# Patient Record
Sex: FEMALE | Race: BLACK OR AFRICAN AMERICAN | HISPANIC OR LATINO | Employment: UNEMPLOYED | URBAN - METROPOLITAN AREA
[De-identification: names, ages, dates, MRNs, and addresses within clinical notes are randomized per-mention and may not be internally consistent; named-entity substitution may affect disease eponyms.]

---

## 2023-02-16 ENCOUNTER — APPOINTMENT (OUTPATIENT)
Dept: LAB | Facility: CLINIC | Age: 53
End: 2023-02-16

## 2023-02-16 ENCOUNTER — OFFICE VISIT (OUTPATIENT)
Dept: FAMILY MEDICINE CLINIC | Facility: CLINIC | Age: 53
End: 2023-02-16

## 2023-02-16 VITALS
TEMPERATURE: 98.6 F | HEART RATE: 92 BPM | DIASTOLIC BLOOD PRESSURE: 76 MMHG | OXYGEN SATURATION: 99 % | WEIGHT: 144.4 LBS | RESPIRATION RATE: 16 BRPM | HEIGHT: 59 IN | BODY MASS INDEX: 29.11 KG/M2 | SYSTOLIC BLOOD PRESSURE: 128 MMHG

## 2023-02-16 DIAGNOSIS — Z86.2 HISTORY OF ANEMIA: ICD-10-CM

## 2023-02-16 DIAGNOSIS — L90.5 SCARRING: ICD-10-CM

## 2023-02-16 DIAGNOSIS — Z23 ENCOUNTER FOR IMMUNIZATION: ICD-10-CM

## 2023-02-16 DIAGNOSIS — Z11.3 SCREENING EXAMINATION FOR STD (SEXUALLY TRANSMITTED DISEASE): ICD-10-CM

## 2023-02-16 DIAGNOSIS — J45.20 MILD INTERMITTENT ASTHMA WITHOUT COMPLICATION: Primary | ICD-10-CM

## 2023-02-16 DIAGNOSIS — Z76.89 ENCOUNTER TO ESTABLISH CARE: ICD-10-CM

## 2023-02-16 DIAGNOSIS — J30.1 SEASONAL ALLERGIC RHINITIS DUE TO POLLEN: ICD-10-CM

## 2023-02-16 DIAGNOSIS — Z01.89 ENCOUNTER FOR LABORATORY EXAMINATION: ICD-10-CM

## 2023-02-16 DIAGNOSIS — Z12.11 COLON CANCER SCREENING: ICD-10-CM

## 2023-02-16 DIAGNOSIS — Z12.31 ENCOUNTER FOR SCREENING MAMMOGRAM FOR MALIGNANT NEOPLASM OF BREAST: ICD-10-CM

## 2023-02-16 PROBLEM — J45.909 ASTHMA: Status: ACTIVE | Noted: 2023-02-16

## 2023-02-16 LAB
ANION GAP SERPL CALCULATED.3IONS-SCNC: 5 MMOL/L (ref 4–13)
BASOPHILS # BLD AUTO: 0.04 THOUSANDS/ÂΜL (ref 0–0.1)
BASOPHILS NFR BLD AUTO: 1 % (ref 0–1)
BUN SERPL-MCNC: 8 MG/DL (ref 5–25)
CALCIUM SERPL-MCNC: 9.5 MG/DL (ref 8.3–10.1)
CHLORIDE SERPL-SCNC: 106 MMOL/L (ref 96–108)
CHOLEST SERPL-MCNC: 189 MG/DL
CO2 SERPL-SCNC: 24 MMOL/L (ref 21–32)
CREAT SERPL-MCNC: 0.65 MG/DL (ref 0.6–1.3)
EOSINOPHIL # BLD AUTO: 0.21 THOUSAND/ÂΜL (ref 0–0.61)
EOSINOPHIL NFR BLD AUTO: 3 % (ref 0–6)
ERYTHROCYTE [DISTWIDTH] IN BLOOD BY AUTOMATED COUNT: 15.9 % (ref 11.6–15.1)
EST. AVERAGE GLUCOSE BLD GHB EST-MCNC: 114 MG/DL
GFR SERPL CREATININE-BSD FRML MDRD: 102 ML/MIN/1.73SQ M
GLUCOSE P FAST SERPL-MCNC: 94 MG/DL (ref 65–99)
HBA1C MFR BLD: 5.6 %
HCT VFR BLD AUTO: 38.5 % (ref 34.8–46.1)
HCV AB SER QL: NORMAL
HDLC SERPL-MCNC: 37 MG/DL
HGB BLD-MCNC: 11.4 G/DL (ref 11.5–15.4)
IMM GRANULOCYTES # BLD AUTO: 0.01 THOUSAND/UL (ref 0–0.2)
IMM GRANULOCYTES NFR BLD AUTO: 0 % (ref 0–2)
LDLC SERPL CALC-MCNC: 128 MG/DL (ref 0–100)
LYMPHOCYTES # BLD AUTO: 3.66 THOUSANDS/ÂΜL (ref 0.6–4.47)
LYMPHOCYTES NFR BLD AUTO: 51 % (ref 14–44)
MCH RBC QN AUTO: 24.5 PG (ref 26.8–34.3)
MCHC RBC AUTO-ENTMCNC: 29.6 G/DL (ref 31.4–37.4)
MCV RBC AUTO: 83 FL (ref 82–98)
MONOCYTES # BLD AUTO: 0.47 THOUSAND/ÂΜL (ref 0.17–1.22)
MONOCYTES NFR BLD AUTO: 7 % (ref 4–12)
NEUTROPHILS # BLD AUTO: 2.67 THOUSANDS/ÂΜL (ref 1.85–7.62)
NEUTS SEG NFR BLD AUTO: 38 % (ref 43–75)
NRBC BLD AUTO-RTO: 0 /100 WBCS
PLATELET # BLD AUTO: 374 THOUSANDS/UL (ref 149–390)
PMV BLD AUTO: 8.7 FL (ref 8.9–12.7)
POTASSIUM SERPL-SCNC: 4 MMOL/L (ref 3.5–5.3)
RBC # BLD AUTO: 4.66 MILLION/UL (ref 3.81–5.12)
SODIUM SERPL-SCNC: 135 MMOL/L (ref 135–147)
TRIGL SERPL-MCNC: 118 MG/DL
WBC # BLD AUTO: 7.06 THOUSAND/UL (ref 4.31–10.16)

## 2023-02-16 RX ORDER — MONTELUKAST SODIUM 10 MG/1
10 TABLET ORAL
Qty: 90 TABLET | Refills: 1 | Status: SHIPPED | OUTPATIENT
Start: 2023-02-16

## 2023-02-16 RX ORDER — FLUTICASONE PROPIONATE 50 MCG
1 SPRAY, SUSPENSION (ML) NASAL DAILY
Qty: 11.1 ML | Refills: 3 | Status: SHIPPED | OUTPATIENT
Start: 2023-02-16

## 2023-02-16 RX ORDER — ALBUTEROL SULFATE 90 UG/1
2 AEROSOL, METERED RESPIRATORY (INHALATION) EVERY 4 HOURS PRN
Status: CANCELLED | OUTPATIENT
Start: 2023-02-16

## 2023-02-16 RX ORDER — ALBUTEROL SULFATE 90 UG/1
2 AEROSOL, METERED RESPIRATORY (INHALATION) EVERY 6 HOURS PRN
Qty: 18 G | Refills: 5 | Status: SHIPPED | OUTPATIENT
Start: 2023-02-16

## 2023-02-16 RX ORDER — ZOSTER VACCINE RECOMBINANT, ADJUVANTED 50 MCG/0.5
0.5 KIT INTRAMUSCULAR ONCE
Qty: 1 EACH | Refills: 1 | Status: SHIPPED | OUTPATIENT
Start: 2023-02-16 | End: 2023-02-16

## 2023-02-16 NOTE — ASSESSMENT & PLAN NOTE
-Asthma is controlled  Last asthma attacks was last month  -Recommend using albuterol PRN  -Discussed about avoiding triggers

## 2023-02-16 NOTE — PROGRESS NOTES
Name: Stacy Ta      : 1970      MRN: [id number]  Encounter Provider: PETER Archer  Encounter Date: 2023   Encounter department: [address] [place] [address]     1  Mild intermittent asthma without complication  Assessment & Plan:  -Asthma is controlled  Last asthma attacks was last month  -Recommend using albuterol PRN  -Discussed about avoiding triggers  Orders:  -     albuterol (Ventolin HFA) 90 mcg/act inhaler; Inhale 2 puffs every 6 (six) hours as needed for wheezing    2  Encounter to establish care  Assessment & Plan:  -Advised pt to schedule cervical CA screening appt  3  BMI 29 0-29 9,adult  Assessment & Plan: Body mass index is 29 67 kg/m²  The BMI is above normal  Nutrition recommendations include reducing portion sizes, decreasing overall calorie intake, 3-5 servings of fruits/vegetables daily, reducing fast food intake, consuming healthier snacks, decreasing soda and/or juice intake, moderation in carbohydrate intake, increasing intake of lean protein, reducing intake of saturated fat and trans fat and reducing intake of cholesterol  Exercise recommendations include moderate aerobic physical activity for 150 minutes/week  Orders:  -     Basic metabolic panel; Future  -     Lipid Panel with Direct LDL reflex; Future  -     HEMOGLOBIN A1C W/ EAG ESTIMATION; Future    4  Encounter for immunization  -     influenza vaccine, quadrivalent, recombinant, PF, 0 5 mL, for patients 18 yr+ (FLUBLOK)  -     TDAP VACCINE GREATER THAN OR EQUAL TO 6YO IM  -     Zoster Vac Recomb Adjuvanted (Shingrix) 50 MCG/0 5ML SUSR; Inject 0 5 mL into a muscle once for 1 dose Repeat dose in 2 to 6 months  -     Pneumococcal Conjugate Vaccine 20-valent (Pcv20)    5  Screening examination for STD (sexually transmitted disease)  -     Chlamydia/GC amplified DNA by PCR; Future  -     Hepatitis C antibody;  Future  -     Trichomonas Vaginalis, SALLY; Future  -     : HIV 1/2 AB/AG w Reflex [facility] for 2 yr old and above; Future  -     RPR (Reflex Only-Do Not Order); Future    6  Seasonal allergic rhinitis due to pollen  Assessment & Plan:  - Continue montelukast   - May use flonase if montelukast does not help  - Avoid triggers    Orders:  -     fluticasone (FLONASE) 50 mcg/act nasal spray; 1 spray into each nostril daily  -     montelukast (SINGULAIR) 10 mg tablet; Take 1 tablet (10 mg total) by mouth daily at bedtime    7  Colon cancer screening  -     Ambulatory referral for colonoscopy; Future    8  Encounter for screening mammogram for malignant neoplasm of breast  -     Mammo screening bilateral w 3d & cad; Future; Expected date: 02/16/2023    9  History of anemia  Assessment & Plan:  -Patient reported having Hx of anemia  Denies overt signs of bleeding, does not know cause of prior anemia   -Will check CBC  Orders:  -     CBC and differential; Future    10  Scarring  -     Ambulatory Referral to Plastic Surgery; Future         Subjective        Viry Albarran is a 46year old female who presents today to [facility]  Patient is here for evaluation of asthma  Patient states asthma symptoms has been controlled  Last asthma attack was a month ago  Patient's symptoms include dyspnea, non-productive cough and wheezing  Associated symptoms include nasal congestion  The patient has been suffering from these symptoms for approximately 35  years  Patient states taking montelukast for allergies which helps keep asthma better controlled as per the patient  Suspected precipitants include cold and hot air  Patient has not required emergency room treatment for these symptoms, and has not required hospitalization  She does use albuterol when she has SOB or wheezing but reports rare use  Once a month at the most      She is requesting STD screening  She denies any known exposure but wants STD testing for reassurance sake      She would also like a plastic surgeon referral for her  scar as it makes her feel very self-conscious  Review of Systems   Constitutional: Negative for chills and fever  HENT: Negative for ear pain and sore throat  Eyes: Negative for pain and visual disturbance  Respiratory: Negative for cough and shortness of breath  Cardiovascular: Negative for chest pain and palpitations  Gastrointestinal: Negative for abdominal pain and vomiting  Genitourinary: Negative for dysuria and hematuria  Musculoskeletal: Negative for arthralgias and back pain  Skin: Negative for color change and rash  Neurological: Negative for seizures and syncope  Hematological: Negative  Psychiatric/Behavioral: Negative  All other systems reviewed and are negative  No current outpatient medications on file prior to visit  Objective     /76 (BP Location: Right arm, Patient Position: Sitting, Cuff Size: Standard)   Pulse 92   Temp 98 6 °F (37 °C) (Temporal)   Resp 16   Ht 4' 10 5" (1 486 m)   Wt 65 5 kg (144 lb 6 4 oz)   SpO2 99%   BMI 29 67 kg/m²     Physical Exam  Vitals and nursing note reviewed  Constitutional:       Appearance: Normal appearance  She is overweight  HENT:      Head: Normocephalic  Right Ear: Tympanic membrane, ear canal and external ear normal       Left Ear: Tympanic membrane, ear canal and external ear normal       Nose: Nose normal       Mouth/Throat:      Mouth: Mucous membranes are moist       Pharynx: Oropharynx is clear  Eyes:      General:         Right eye: No discharge  Left eye: No discharge  Cardiovascular:      Rate and Rhythm: Normal rate and regular rhythm  Pulses: Normal pulses  Heart sounds: Normal heart sounds  Pulmonary:      Effort: Pulmonary effort is normal       Breath sounds: Normal breath sounds  Abdominal:      General: Abdomen is flat  Bowel sounds are normal    Musculoskeletal:         General: Normal range of motion        Cervical back: Normal range of motion  Right lower leg: No edema  Left lower leg: No edema  Skin:     General: Skin is warm and dry  Neurological:      General: No focal deficit present  Mental Status: She is alert and oriented to person, place, and time  Psychiatric:         Mood and Affect: Mood normal          Behavior: Behavior normal          Thought Content:  Thought content normal          Judgment: Judgment normal        Tessa Anca

## 2023-02-16 NOTE — ASSESSMENT & PLAN NOTE
Body mass index is 29 67 kg/m²  The BMI is above normal  Nutrition recommendations include reducing portion sizes, decreasing overall calorie intake, 3-5 servings of fruits/vegetables daily, reducing fast food intake, consuming healthier snacks, decreasing soda and/or juice intake, moderation in carbohydrate intake, increasing intake of lean protein, reducing intake of saturated fat and trans fat and reducing intake of cholesterol  Exercise recommendations include moderate aerobic physical activity for 150 minutes/week

## 2023-02-17 LAB
C TRACH DNA SPEC QL NAA+PROBE: NEGATIVE
HIV 1+2 AB+HIV1 P24 AG SERPL QL IA: NORMAL
HIV 2 AB SERPL QL IA: NORMAL
HIV1 AB SERPL QL IA: NORMAL
HIV1 P24 AG SERPL QL IA: NORMAL
N GONORRHOEA DNA SPEC QL NAA+PROBE: NEGATIVE
TREPONEMA PALLIDUM IGG+IGM AB [PRESENCE] IN SERUM OR PLASMA BY IMMUNOASSAY: NORMAL

## 2023-02-18 LAB — T VAGINALIS RRNA SPEC QL NAA+PROBE: NEGATIVE

## 2023-03-08 ENCOUNTER — APPOINTMENT (OUTPATIENT)
Dept: LAB | Facility: CLINIC | Age: 53
End: 2023-03-08

## 2023-03-08 ENCOUNTER — OFFICE VISIT (OUTPATIENT)
Dept: OBGYN CLINIC | Facility: CLINIC | Age: 53
End: 2023-03-08

## 2023-03-08 VITALS
WEIGHT: 147.2 LBS | BODY MASS INDEX: 29.68 KG/M2 | HEART RATE: 94 BPM | DIASTOLIC BLOOD PRESSURE: 66 MMHG | SYSTOLIC BLOOD PRESSURE: 126 MMHG | HEIGHT: 59 IN

## 2023-03-08 DIAGNOSIS — Z01.419 ENCOUNTER FOR GYNECOLOGICAL EXAMINATION WITHOUT ABNORMAL FINDING: Primary | ICD-10-CM

## 2023-03-08 DIAGNOSIS — Z11.3 SCREEN FOR STD (SEXUALLY TRANSMITTED DISEASE): ICD-10-CM

## 2023-03-08 DIAGNOSIS — Z12.39 ENCOUNTER FOR BREAST CANCER SCREENING USING NON-MAMMOGRAM MODALITY: ICD-10-CM

## 2023-03-08 DIAGNOSIS — Z12.31 ENCOUNTER FOR SCREENING MAMMOGRAM FOR MALIGNANT NEOPLASM OF BREAST: ICD-10-CM

## 2023-03-08 DIAGNOSIS — Z12.11 SCREENING FOR COLON CANCER: ICD-10-CM

## 2023-03-08 DIAGNOSIS — Z12.4 SCREENING FOR CERVICAL CANCER: ICD-10-CM

## 2023-03-08 PROBLEM — L90.5 SCARRING: Status: RESOLVED | Noted: 2023-02-16 | Resolved: 2023-03-08

## 2023-03-08 PROBLEM — J45.909 ASTHMA DUE TO SEASONAL ALLERGIES: Status: RESOLVED | Noted: 2023-02-16 | Resolved: 2023-03-08

## 2023-03-08 PROBLEM — J30.1 SEASONAL ALLERGIC RHINITIS DUE TO POLLEN: Status: RESOLVED | Noted: 2023-02-16 | Resolved: 2023-03-08

## 2023-03-08 PROBLEM — Z86.2 HISTORY OF ANEMIA: Status: RESOLVED | Noted: 2023-02-16 | Resolved: 2023-03-08

## 2023-03-08 PROBLEM — Z76.89 ENCOUNTER TO ESTABLISH CARE: Status: RESOLVED | Noted: 2023-02-16 | Resolved: 2023-03-08

## 2023-03-08 LAB
HBV SURFACE AG SER QL: NORMAL
HCV AB SER QL: NORMAL

## 2023-03-08 NOTE — LETTER
3/9/2023    To Elaine Gr  : 1970      Esta carta es para informarle que soraida resultados recientes de ANÁLISIS DE [place] para Enfermedades de Transmisión Sexual (Gonzalo Junior, hepatitis B, hepatitis C, y sífilis) fueron revisados por  y son Joselyn Flores    Póngase en contacto con nuestra oficina para kaylen ezio si tiene alguna inquietud [address] [place] [address]

## 2023-03-08 NOTE — PROGRESS NOTES
Sterling Rick is a 46 y o  female who presents today for annual GYN exam   Her last pap smear was performed last year and result was WNL per patient  She reports no history of abnormal pap smears in her past   Her last mammogram was performed last year and result was WNL per patient  She reports that she had colon cancer screening performed in  while still living in [place] and states that it was WNL and that she should have it repeated in 5 years  She reports menses as irregular for past 2 years  Patient's last menstrual period was 2023  Her general medical history has been reviewed and she reports it as follows:    Past Medical History:   Diagnosis Date   • Asthma      Past Surgical History:   Procedure Laterality Date   •  SECTION     • EXCISIONAL HEMORRHOIDECTOMY       OB History        4    Para   2    Term   2       0    AB   2    Living   2       SAB   0    IAB   2    Ectopic   0    Multiple   0    Live Births   2               Social History     Tobacco Use   • Smoking status: Never   • Smokeless tobacco: Never   Vaping Use   • Vaping Use: Never used   Substance Use Topics   • Alcohol use: Never   • Drug use: Never     Social History     Substance and Sexual Activity   Sexual Activity Yes   • Partners: Male   • Birth control/protection: None     Cancer-related family history includes Breast cancer in her sister; Colon cancer in her father  There is no history of Ovarian cancer  Current Outpatient Medications   Medication Instructions   • albuterol (Ventolin HFA) 90 mcg/act inhaler 2 puffs, Inhalation, Every 6 hours PRN   • fluticasone (FLONASE) 50 mcg/act nasal spray 1 spray, Nasal, Daily   • montelukast (SINGULAIR) 10 mg, Oral, Daily at bedtime       Review of Systems:  Review of Systems   Constitutional: Negative  Gastrointestinal: Negative      Genitourinary: Negative for difficulty urinating, menstrual problem, pelvic pain and vaginal discharge  Skin: Negative  Physical Exam:  /66   Pulse 94   Ht 4' 10 5" (1 486 m)   Wt 66 8 kg (147 lb 3 2 oz)   LMP 03/01/2023   BMI 30 24 kg/m²   Physical Exam  Constitutional:       General: She is not in acute distress  Appearance: She is well-developed  Genitourinary:      Vulva normal       No lesions in the vagina  Right Adnexa: not tender and no mass present  Left Adnexa: not tender and no mass present  No cervical motion tenderness or lesion  Uterus is not tender  Breasts:     Right: No mass, nipple discharge, skin change or tenderness  Left: No mass, nipple discharge, skin change or tenderness  Neck:      Thyroid: No thyromegaly  Cardiovascular:      Rate and Rhythm: Normal rate and regular rhythm  Pulmonary:      Effort: Pulmonary effort is normal    Abdominal:      Palpations: Abdomen is soft  Tenderness: There is no abdominal tenderness  Musculoskeletal:      Cervical back: Neck supple  Neurological:      Mental Status: She is alert and oriented to person, place, and time  Skin:     General: Skin is warm and dry  Vitals reviewed  Assessment/Plan:   1  Normal well-woman GYN exam   2  Cervical cancer screening:  Normal cervical exam   Pap smear done with HPV co-testing  3  STD screening:  Orders placed for vaginal GC/CT cultures  Orders placed for serum anti-HIV, anti-HCV, HbsAg, RPR    4  Breast cancer screening:  Normal breast exam   Order placed for bilateral screening mammogram   Reviewed breast self-awareness  5  Colon cancer screening:  Up to date per patient report  6  Depression Screening: Patient's depression screening was assessed with a PHQ-2 score of 2  Their PHQ-9 score was 4  Clinically patient does not have depression  No treatment is required  7  BMI Counseling: Body mass index is 30 24 kg/m²  Discussed the patient's BMI with her   The BMI is above normal  Patient referred to PCP due to patient being obese  8  Contraception:  Declines     9  Return to office in 1 year for annual GYN exam

## 2023-03-08 NOTE — PATIENT INSTRUCTIONS
Ale por ha confianza en nuestro equipo  Seretha Rocky y agradecemos soraida comentarios  Si recibe kaylen encuesta nuestra, tómese unos momentos para informarnos cómo estamos  Sinceramente,  Meeker Solian, CRNP     OBESIDAD     Obesidad es cuando ha índice de masa corporal [facility]) es superior a 30  Ha Body mass index is 30 24 kg/m²  [facility] Los riesgos de la obesidad incluyen  muchos problemas de [place], incluidas las lesiones y la discapacidad física  Es posible que deba realizarse exámenes para detectar lo siguiente:  Diabetes     Hipertensión o colesterol altoEnfermedades del corazón     Enfermedad cardíaca     Enfermedades del hígado o de la vesícula biliar     Cáncer de colon, de pecho, de próstata, de hígado o de riñón     El apnea del sueño     Artritis o gota    Busque atención médica de inmediato si:   Usted tiene un intenso dolor de lay, confusión o dificultad para hablar  Usted tiene debilidad en un lado del cuerpo  Usted tiene [employer], sudoración o falta de aire  Pregúntele a ha [place] vitaminas y minerales son adecuados para usted  Usted tiene síntomas de enfermedad de la vesícula biliar o el hígado, walter dolor en la parte superior del abdomen  Usted tiene dolor e incomodidad de rodillas o caderas al caminar  Usted presenta síntomas de diabetes, walter exceso de apetito y sed y micción frecuente  Usted presenta síntomas de apnea de sueño, walter roncar o tener sueño la el día  Usted tiene preguntas o inquietudes acerca de ha condición o cuidado  El tratamiento para la obesidad  se enfoca en ayudarle a bajar de peso para mejorar ha faith  Incluso kaylen reducción mínima del índice de [employer] corporal puede reducir el riesgo de muchos problemas de [place]  Ha médico lo ayudará a fijarse kaylen meta para bajar de [place]  Cambios en el estilo de hernandez  son los primeros pasos para tratar la obesidad   Estos cambios incluyen carmen decisiones para consumir alimentos saludables y realizar kaylen actividad física con regularidad  El médico puede recomendarle un programa para bajar de peso que consta de capacitación, educación y [place]  Medicamento  le pueden ayudar a bajar de peso cuando los [place] en conjunto con Milly Rojas y Rob Roth  Cirugía  le puede ayudar a bajar de peso si usted es muy cindi y presenta otros problemas de faith  Existen varios tipos de [facility] para adelgazar  [place] a ha médico más información  Cómo perder peso de forma exitosa:   Propóngase metas accesibles y realistas  Un ejemplo de kaylen meta accesible es caminar 20 minutos los 5 días de la [place]  Otro objetivo puede ser perder 5% de ha peso corporal     Coméntele a soraida amigos, familiares y compañeros de trabajo sobre soraida metas  y solicite que lo apoyen  Convide a un amigo para hacer ejercicio juntos o acuda a un wanda de motivación para bajar de [place]  Identifique los alimentos o situaciones que le pueden provocar que coma en exceso y busque formas para evitarlos  Deshágase de alimentos altos en calorías en ha hogar o en el trabajo  En la cocina tenga kaylen canasta con frutas frescas  Si el estrés es la causa para que usted coma más encuentre formas para sobrellevar el estrés  Lleve un diario en el que registre lo que usted come y facundo  También escriba la cantidad de tiempo que pasa realizando kaylen actividad física la el día  Pésese kaylen vez a la semana y anótelo en ah diario  Cambios en la alimentación:  Usted necesitará consumir menos de 500 a 1 000 calorías al día de las que usted actualmente consume para perder entre 1 a 2 libras a la semana  Los siguientes cambios le ayudarán a disminuir la cantidad de las calorías que normalmente consume:  Reduzca el tamaño de las porciones  Utilice platos pequeños que no midan más de 9 pulgadas de diámetro  Llene la mitad del plato con frutas y verduras   Utilice las tazas medidoras para racionar los alimentos hasta que usted sepa walter se ve el tamaño de kaylen porción  Consuma 3 comidas y 1 o 2 meriendas al día  Planee soraida comidas con anterioridad  Cocine y coma en la casa todo el tiempo que le sea posible  Coma lentamente  Consuma frutas y verduras con todas las comidas  Corinda Rene y verduras son bajas en calorías y altas en fibra lo cual lo llena  No adicione mantequilla, ni margarina, ni salsas a base de crema a las verduras  Utilice las hierbas para sazonar las verduras al vapor  Consuma menos grasas y alimentos fritos  Consuma ryan o pescado al horno o la juan f  Estas proteínas son más bajas en calorías y grasas que la carne [place]  Limite las comidas rápidas  Es mejor que utilice aderezos para la ensalada a base de aceite de [place] y vinagre en vez de aderezos en botella  Limite la cantidad de azúcar que consume  No consuma bebidas azucaradas  Limite el consumo de alcohol  Cambios de actividad:  La actividad física es buena para ha cuerpo por muchas razones  Le ayuda a quemar calorías y fortalecer soraida músculos  Merrily Leticia y la depresión y mejora ha estado de ánimo  Además puede ayudarle a dormir mejor  Consulte con ha médico antes de empezar un régimen de ejercicios  Realice kaylen actividad física por lo menos por 30 minutos 5 días a la semana  Empiece despacio  Aparte tiempo cada día para kaylen actividad física que usted victor hugo y [employer] sea [facility]  Es mejor realizar tanto un programa de pesas wal[place] para aumentar ha ritmo cardíaco, walter caminar, montar en bicicleta o nadar  Busque formas para ser [employer]  Realice kaylen actividad de jardinería y limpiar la casa  [address] escaleras en vez de utilizar el elevador  En ha tiempo bill concurra a eventos que le exijan caminar, walter festivales y ferias al [place]  Adicionar esta actividad física puede ayudarle a bajar y [address]  Acuda a soraida consultas de control con ha médico según le indicaron    Puede que necesite consultar con un dietista  Anote soraida preguntas para que se acuerde de hacerlas la soraida visitas

## 2023-03-08 NOTE — LETTER
3/13/2023    To Elaine Gr  : 1970      Esta carta es para informarle que soraida CULTURAS recientes para la gonorrea y la clamidia fueron revisadas por mí y son Gerardine Jan    Comuníquese con la oficina para kaylen ezio si tiene alguna inquietud [address]

## 2023-03-09 LAB
HIV 1+2 AB+HIV1 P24 AG SERPL QL IA: NORMAL
HIV 2 AB SERPL QL IA: NORMAL
HIV1 AB SERPL QL IA: NORMAL
HIV1 P24 AG SERPL QL IA: NORMAL
TREPONEMA PALLIDUM IGG+IGM AB [PRESENCE] IN SERUM OR PLASMA BY IMMUNOASSAY: NORMAL

## 2023-03-10 LAB
HPV HR 12 DNA CVX QL NAA+PROBE: POSITIVE
HPV16 DNA CVX QL NAA+PROBE: NEGATIVE
HPV18 DNA CVX QL NAA+PROBE: NEGATIVE

## 2023-03-11 LAB
C TRACH DNA SPEC QL NAA+PROBE: NEGATIVE
N GONORRHOEA DNA SPEC QL NAA+PROBE: NEGATIVE

## 2023-03-16 ENCOUNTER — TELEPHONE (OUTPATIENT)
Dept: OBGYN CLINIC | Facility: CLINIC | Age: 53
End: 2023-03-16

## 2023-03-16 LAB
LAB AP GYN PRIMARY INTERPRETATION: NORMAL
Lab: NORMAL

## 2023-03-16 NOTE — TELEPHONE ENCOUNTER
Please contact patient and advise her of normal pap smear, but + HPV  Negative for the 2 most concerning HPV strains though  Recommend repeat pap smear in 1 year

## 2023-10-11 ENCOUNTER — OFFICE VISIT (OUTPATIENT)
Dept: FAMILY MEDICINE CLINIC | Facility: CLINIC | Age: 53
End: 2023-10-11

## 2023-10-11 VITALS
OXYGEN SATURATION: 98 % | WEIGHT: 149 LBS | BODY MASS INDEX: 30.04 KG/M2 | RESPIRATION RATE: 18 BRPM | TEMPERATURE: 97.8 F | DIASTOLIC BLOOD PRESSURE: 80 MMHG | SYSTOLIC BLOOD PRESSURE: 118 MMHG | HEART RATE: 74 BPM | HEIGHT: 59 IN

## 2023-10-11 DIAGNOSIS — G56.03 BILATERAL CARPAL TUNNEL SYNDROME: ICD-10-CM

## 2023-10-11 DIAGNOSIS — N95.1 HOT FLASHES DUE TO MENOPAUSE: Primary | ICD-10-CM

## 2023-10-11 DIAGNOSIS — J30.1 SEASONAL ALLERGIC RHINITIS DUE TO POLLEN: ICD-10-CM

## 2023-10-11 DIAGNOSIS — J45.20 MILD INTERMITTENT ASTHMA WITHOUT COMPLICATION: ICD-10-CM

## 2023-10-11 PROCEDURE — 99214 OFFICE O/P EST MOD 30 MIN: CPT

## 2023-10-11 RX ORDER — FLUTICASONE PROPIONATE 50 MCG
1 SPRAY, SUSPENSION (ML) NASAL DAILY
Qty: 11.1 ML | Refills: 3 | Status: SHIPPED | OUTPATIENT
Start: 2023-10-11

## 2023-10-11 RX ORDER — VITAMIN E 268 MG
400 CAPSULE ORAL DAILY
Qty: 90 CAPSULE | Refills: 1 | Status: SHIPPED | OUTPATIENT
Start: 2023-10-11

## 2023-10-11 RX ORDER — ALBUTEROL SULFATE 90 UG/1
2 AEROSOL, METERED RESPIRATORY (INHALATION) EVERY 6 HOURS PRN
Qty: 18 G | Refills: 5 | Status: SHIPPED | OUTPATIENT
Start: 2023-10-11

## 2023-10-11 RX ORDER — NAPROXEN SODIUM 220 MG
220 TABLET ORAL 2 TIMES DAILY WITH MEALS
Qty: 90 TABLET | Refills: 1 | Status: SHIPPED | OUTPATIENT
Start: 2023-10-11

## 2023-10-11 RX ORDER — MONTELUKAST SODIUM 10 MG/1
10 TABLET ORAL
Qty: 90 TABLET | Refills: 1 | Status: SHIPPED | OUTPATIENT
Start: 2023-10-11

## 2023-10-11 NOTE — PROGRESS NOTES
Name: Addy Anders      : 1970      MRN: [id number]  Encounter Provider: PETER Hernadez  Encounter Date: 10/11/2023   Encounter department: [address] [facility] [facility]     1. Hot flashes due to menopause  Assessment & Plan:  Start vit E. Discussed lifestyle management. Recommend seeing obgyn if no improvement    Orders:  -     vitamin E, tocopherol, 400 units capsule; Take 1 capsule (400 Units total) by mouth daily    2. Mild intermittent asthma without complication  -     albuterol (Ventolin HFA) 90 mcg/act inhaler; Inhale 2 puffs every 6 (six) hours as needed for wheezing    3. Seasonal allergic rhinitis due to pollen  -     fluticasone (FLONASE) 50 mcg/act nasal spray; 1 spray into each nostril daily  -     montelukast (SINGULAIR) 10 mg tablet; Take 1 tablet (10 mg total) by mouth daily at bedtime    4. Bilateral carpal tunnel syndrome  Assessment & Plan:  - Wrist splints, especially at night  - OT/PT  - tylenol/nsaids prn  - Consider EMG if no improvement. - ortho f/u if no improvement for possible cortisone injection. Orders:  -     naproxen sodium (ALEVE) 220 MG tablet; Take 1 tablet (220 mg total) by mouth 2 (two) times a day with meals  -     Ambulatory referral to PT/OT hand therapy; Future           Subjective      Addy Anders is a 48 y.o. female  has a past medical history of Abnormal Pap smear of cervix and Asthma. has a past surgical history that includes  section () and Excisional hemorrhoidectomy (). She reports that she has been having hot flashes began about 5 months ago. Hot flashes occur randomly. LMP was about 2 months ago. She has not tried medication for this. Carpal Tunnel Syndrome  Patient presents for evaluation of pain in hands, hand paresthesias, and possible carpal tunnel syndrome. Onset of the symptoms was 2 months ago. Current symptoms include: pain, tingling/numbness, and weakness. Aggravating factors: denies target symptoms of hypothyroidism, worse at night or first thing in the morning, and worse with activity. Symptoms have progressed to a point and plateaued. Evaluation to date: none. Treatment to date: none. Pain is 8/10    Chronic conditions are stable unless otherwise noted. Hand Pain   Associated symptoms include numbness. Pertinent negatives include no chest pain. Review of Systems   Constitutional:  Negative for chills and fever. HENT:  Negative for ear pain and sore throat. Eyes:  Negative for pain and visual disturbance. Respiratory:  Negative for cough and shortness of breath. Cardiovascular:  Negative for chest pain and palpitations. Gastrointestinal:  Negative for abdominal pain and vomiting. Genitourinary:  Negative for dysuria and hematuria. Musculoskeletal:  Positive for myalgias. Negative for arthralgias and back pain. Skin:  Negative for color change and rash. Neurological:  Positive for numbness. Negative for seizures and syncope. All other systems reviewed and are negative. Current Outpatient Medications on File Prior to Visit   Medication Sig    [DISCONTINUED] albuterol (Ventolin HFA) 90 mcg/act inhaler Inhale 2 puffs every 6 (six) hours as needed for wheezing    [DISCONTINUED] fluticasone (FLONASE) 50 mcg/act nasal spray 1 spray into each nostril daily    [DISCONTINUED] montelukast (SINGULAIR) 10 mg tablet Take 1 tablet (10 mg total) by mouth daily at bedtime       Objective     /80 (BP Location: Left arm, Patient Position: Sitting, Cuff Size: Standard)   Pulse 74   Temp 97.8 °F (36.6 °C) (Temporal)   Resp 18   Ht 4' 10.5" (1.486 m)   Wt 67.6 kg (149 lb)   LMP  (LMP Unknown)   SpO2 98%   BMI 30.61 kg/m²     Physical Exam  Vitals and nursing note reviewed. Constitutional:       Appearance: She is obese. HENT:      Head: Normocephalic and atraumatic.       Right Ear: External ear normal.      Left Ear: External ear normal.      Nose: Nose normal.   Eyes:      Extraocular Movements: Extraocular movements intact. Conjunctiva/sclera: Conjunctivae normal.      Pupils: Pupils are equal, round, and reactive to light. Cardiovascular:      Rate and Rhythm: Normal rate and regular rhythm. Pulses: Normal pulses. Heart sounds: Normal heart sounds. Pulmonary:      Effort: Pulmonary effort is normal.      Breath sounds: Normal breath sounds. Abdominal:      General: Bowel sounds are normal.      Palpations: Abdomen is soft. Tenderness: There is no abdominal tenderness. Musculoskeletal:         General: Normal range of motion. Right hand: Normal.      Left hand: Normal.      Cervical back: Normal range of motion. Comments: Bilateral hands: (-) tinels, (-) phalens, (+) durkans     Skin:     General: Skin is warm and dry. Neurological:      General: No focal deficit present. Mental Status: She is alert and oriented to person, place, and time. Mental status is at baseline. Psychiatric:         Mood and Affect: Mood normal.         Behavior: Behavior normal.         Thought Content:  Thought content normal.         Judgment: Judgment normal.       Tanvi Mealing

## 2023-10-11 NOTE — PATIENT INSTRUCTIONS
Síndrome del túnel carpiano   CUIDADO AMBULATORIO:   El síndrome del túnel carpiano es kaylen afección que causa que se acumule presión en el túnel carpiano. El túnel carpiano es kaylen área pequeña entre los [place] carlo y tejidos de ha [place]. Kaylen inflamación en esta área pone presión en el nervio mediano. El nervio mediano controla los músculos y la sensación en la [place]. Los signos y síntomas comunes son:  Dolor sordo, alison y punzante en la mano    Sensación de entumecimiento, hormigueo o ardor en el pulgar y en los dedos millie, [place] y anular    Dolor u hormigueo en el brazo que puede subir hacia el hombro    Debilidad en ha mano    Inflamación en ha mano    No poder controlar el movimiento de la [place], o no poder [employer] dedos con facilidad    Busque atención médica de inmediato si:  De repente usted pierde sensación en ha mano o dedos y no los puede . Ha mano de repente cambia de color. Llame a ha médico si:  Nola síntomas [employer]. Ha mano y nola dedos se debilitan tanto que no puede agarrar, apretar o alzar los objetos. Usted tiene preguntas o inquietudes acerca de ha condición o cuidado. El tratamiento podría no ser necesario. Si nola síntomas persisten o son graves, usted podría necesitar cualquier de los siguientes:  ASH pueden disminuir la inflamación y el dolor o la fiebre. Jeff medicamento está disponible con o sin kaylen receta médica. Los ASH pueden causar sangrado estomacal o problemas renales en ciertas personas. Si usted tamir un medicamento anticoagulante, siempre pregúntele a ha médico si los ASH son seguros para usted. Siempre ambrocio la etiqueta de jeff medicamento y [address] instrucciones. Las inyecciones de esteroides podrían ayudar a reducir el dolor y la inflamación. Los esteroides se inyectan dentro del tiffanie foley. La estimulación nerviosa eléctrica transcutánea (ENET) [place] impulsos eléctricos leves para ayudar a reducir ha dolor de ev.     Jorge Cassidy descompresión del túnel carpiano se [place] para eliminar la presión sobre el nervio mediano en ha [place]. El [place] de soraida síntomas:  Aplique hielo a ha ev. El hielo ayuda a reducir la inflamación y el dolor en ha ev. El hielo también puede contribuir a evitar el daño de los tejidos. Use kaylen compresa de hielo o ponga hielo triturado en kaylen bolsa de plástico. Cubra la compresa de hielo con kaylen toalla. Colóquela en ha ev por 15 a 20 minutos cada hora, o walter se le indique. Descanse soraida lennox. Permita que soraida lennox descansen por un tiempo cuando hace movimientos repetitivos walter la mecanografía. Suspenda lo que está haciendo cuando usted sienta dolor en ha ev y suavemente realice un masaje en ha ev y [place]. Acuda a fisioterapia y terapia ocupacional, si se lo indican. Un terapeuta físico le demostrará maneras de realizar ejercicios y fortalecer ha ev. Un terapeuta ocupacional le demostrará maneras seguras de usar ha ev mientras realiza soraida [place]. Use kaylen férula para la [place] se le haya indicado. Kaylen férula mantiene ha ve recta o en kaylen posición ligeramente flexionada. Kaylen férula para la ev reduce la presión en el nervio mediano y de esta manera descansa la [place]. Es probable que usted necesite usar la férula por un keisha de 8 semanas. Es posible que deba usarlo a la noche. Acuda a la consulta de control con ha médico según las indicaciones: Anote soraida preguntas para que se acuerde de hacerlas la soraida visitas. © Copyright Thana Givens 2023 Information is for End User's use only and may not be sold, redistributed or otherwise used for commercial purposes. Esta información es sólo para uso en educación. Ha intención no es darle un consejo médico sobre enfermedades o tratamientos. Colsulte con ha Karyle Shown farmacéutico antes de seguir cualquier régimen médico para saber si es seguro y efectivo para usted.

## 2023-10-11 NOTE — ASSESSMENT & PLAN NOTE
- Wrist splints, especially at night  - OT/PT  - tylenol/nsaids prn  - Consider EMG if no improvement. - ortho f/u if no improvement for possible cortisone injection.

## 2024-06-21 ENCOUNTER — OFFICE VISIT (OUTPATIENT)
Dept: FAMILY MEDICINE CLINIC | Facility: CLINIC | Age: 54
End: 2024-06-21

## 2024-06-21 ENCOUNTER — APPOINTMENT (OUTPATIENT)
Dept: LAB | Facility: CLINIC | Age: 54
End: 2024-06-21
Payer: MEDICARE

## 2024-06-21 VITALS
BODY MASS INDEX: 28.83 KG/M2 | HEART RATE: 74 BPM | TEMPERATURE: 97.2 F | HEIGHT: 59 IN | WEIGHT: 143 LBS | RESPIRATION RATE: 14 BRPM | DIASTOLIC BLOOD PRESSURE: 85 MMHG | OXYGEN SATURATION: 97 % | SYSTOLIC BLOOD PRESSURE: 137 MMHG

## 2024-06-21 DIAGNOSIS — J45.20 MILD INTERMITTENT ASTHMA WITHOUT COMPLICATION: ICD-10-CM

## 2024-06-21 DIAGNOSIS — Z12.31 ENCOUNTER FOR SCREENING MAMMOGRAM FOR BREAST CANCER: ICD-10-CM

## 2024-06-21 DIAGNOSIS — Z11.4 ENCOUNTER FOR SCREENING FOR HIV: ICD-10-CM

## 2024-06-21 DIAGNOSIS — J45.909 ASTHMA DUE TO SEASONAL ALLERGIES: ICD-10-CM

## 2024-06-21 DIAGNOSIS — Z00.00 ENCOUNTER FOR PHYSICAL EXAMINATION: Primary | ICD-10-CM

## 2024-06-21 DIAGNOSIS — E66.3 OVERWEIGHT (BMI 25.0-29.9): ICD-10-CM

## 2024-06-21 DIAGNOSIS — Z86.2 HX OF IRON DEFICIENCY ANEMIA: ICD-10-CM

## 2024-06-21 DIAGNOSIS — G56.03 BILATERAL CARPAL TUNNEL SYNDROME: ICD-10-CM

## 2024-06-21 DIAGNOSIS — Z11.59 ENCOUNTER FOR HEPATITIS C SCREENING TEST FOR LOW RISK PATIENT: ICD-10-CM

## 2024-06-21 DIAGNOSIS — Z23 ENCOUNTER FOR IMMUNIZATION: ICD-10-CM

## 2024-06-21 LAB
BASOPHILS # BLD AUTO: 0.05 THOUSANDS/ÂΜL (ref 0–0.1)
BASOPHILS NFR BLD AUTO: 1 % (ref 0–1)
CHOLEST SERPL-MCNC: 199 MG/DL
EOSINOPHIL # BLD AUTO: 0.34 THOUSAND/ÂΜL (ref 0–0.61)
EOSINOPHIL NFR BLD AUTO: 4 % (ref 0–6)
ERYTHROCYTE [DISTWIDTH] IN BLOOD BY AUTOMATED COUNT: 16 % (ref 11.6–15.1)
HCT VFR BLD AUTO: 35.3 % (ref 34.8–46.1)
HDLC SERPL-MCNC: 42 MG/DL
HGB BLD-MCNC: 11 G/DL (ref 11.5–15.4)
IMM GRANULOCYTES # BLD AUTO: 0.02 THOUSAND/UL (ref 0–0.2)
IMM GRANULOCYTES NFR BLD AUTO: 0 % (ref 0–2)
LDLC SERPL CALC-MCNC: 137 MG/DL (ref 0–100)
LYMPHOCYTES # BLD AUTO: 4.36 THOUSANDS/ÂΜL (ref 0.6–4.47)
LYMPHOCYTES NFR BLD AUTO: 48 % (ref 14–44)
MCH RBC QN AUTO: 24.9 PG (ref 26.8–34.3)
MCHC RBC AUTO-ENTMCNC: 31.2 G/DL (ref 31.4–37.4)
MCV RBC AUTO: 80 FL (ref 82–98)
MONOCYTES # BLD AUTO: 0.62 THOUSAND/ÂΜL (ref 0.17–1.22)
MONOCYTES NFR BLD AUTO: 7 % (ref 4–12)
NEUTROPHILS # BLD AUTO: 3.64 THOUSANDS/ÂΜL (ref 1.85–7.62)
NEUTS SEG NFR BLD AUTO: 40 % (ref 43–75)
NONHDLC SERPL-MCNC: 157 MG/DL
NRBC BLD AUTO-RTO: 0 /100 WBCS
PLATELET # BLD AUTO: 390 THOUSANDS/UL (ref 149–390)
PMV BLD AUTO: 8.7 FL (ref 8.9–12.7)
RBC # BLD AUTO: 4.41 MILLION/UL (ref 3.81–5.12)
TRIGL SERPL-MCNC: 100 MG/DL
WBC # BLD AUTO: 9.03 THOUSAND/UL (ref 4.31–10.16)

## 2024-06-21 PROCEDURE — 36415 COLL VENOUS BLD VENIPUNCTURE: CPT

## 2024-06-21 PROCEDURE — 80061 LIPID PANEL: CPT

## 2024-06-21 PROCEDURE — 90471 IMMUNIZATION ADMIN: CPT

## 2024-06-21 PROCEDURE — 99396 PREV VISIT EST AGE 40-64: CPT | Performed by: FAMILY MEDICINE

## 2024-06-21 PROCEDURE — 83036 HEMOGLOBIN GLYCOSYLATED A1C: CPT

## 2024-06-21 PROCEDURE — 90750 HZV VACC RECOMBINANT IM: CPT

## 2024-06-21 PROCEDURE — 85025 COMPLETE CBC W/AUTO DIFF WBC: CPT

## 2024-06-21 PROCEDURE — 86803 HEPATITIS C AB TEST: CPT

## 2024-06-21 PROCEDURE — 87389 HIV-1 AG W/HIV-1&-2 AB AG IA: CPT

## 2024-06-21 RX ORDER — BUDESONIDE AND FORMOTEROL FUMARATE DIHYDRATE 80; 4.5 UG/1; UG/1
2 AEROSOL RESPIRATORY (INHALATION) 2 TIMES DAILY
Qty: 10.2 G | Refills: 1 | Status: SHIPPED | OUTPATIENT
Start: 2024-06-21

## 2024-06-21 RX ORDER — NAPROXEN SODIUM 220 MG
220 TABLET ORAL AS NEEDED
Start: 2024-06-21

## 2024-06-21 NOTE — PROGRESS NOTES
Adult Annual Physical  Name: Drew Gómez      : 1970      MRN: [id number]  Encounter Provider: Abbey Black MD  Encounter Date: 2024   Encounter department: [facility]    Assessment & Plan   1. Encounter for physical examination  2. Encounter for screening mammogram for breast cancer  -     Mammo screening bilateral w 3d & cad; Future  3. Encounter for immunization  -     Zoster Vaccine Recombinant IM  4. Bilateral carpal tunnel syndrome  -     naproxen sodium (ALEVE) 220 MG tablet; Take 1 tablet (220 mg total) by mouth as needed for mild pain  5. Encounter for hepatitis C screening test for low risk patient  -     Hepatitis C antibody; Future  6. Encounter for screening for HIV  -     HIV 1/2 AG/AB w Reflex [facility] for 2 yr old and above; Future  7. Overweight (BMI 25.0-29.9)  -     Lipid panel; Future  -     Hemoglobin A1C; Future  8. Hx of iron deficiency anemia  -     CBC and differential; Future  9. Asthma due to seasonal allergies  Assessment & Plan:  Nonadherent to albuterol as needed    -Start Symbicort 80-4.5 MCG/ATC 1-2 puffs twice daily.   -Claritin as needed  -Return in 1- 2 months to reevaluate  Orders:  -     budesonide-formoterol (Symbicort) 80-4.5 MCG/ACT inhaler; Inhale 2 puffs 2 (two) times a day Rinse mouth after use.  10. Mild intermittent asthma without complication  Assessment & Plan:  Non adherent to albuterol inhaler/ neb  Chest tightness related to asthma, increased in the summer due to pollen     - will try Symbicort 2 puffs BID and follow up in 1 moth.   - continue montelukast 10 mg tab daily      Immunizations and preventive care screenings were discussed with patient today. Appropriate education was printed on patient's after visit summary.- Patient received Shingles shot today    Counseling:  Alcohol/drug use: discussed moderation in alcohol intake, the recommendations for healthy alcohol use, and avoidance of illicit drug  "use.-Patient does not smoke cigarettes, drinks only socially once a week, denies use of illicit drugs.     BMI Counseling: Body mass index is 29.38 kg/m². The BMI is above normal. Nutrition recommendations include decreasing fast food intake, limiting drinks that contain sugar and reducing intake of cholesterol. No pharmacotherapy was ordered. Patient referred to PCP. Rationale for BMI follow-up plan is due to patient being overweight or obese.     Depression Screening and Follow-up Plan: Patient was screened for depression during today's encounter. They screened negative with a PHQ-2 score of 0.        History of Present Illness     Patient here for a \" routine check\".  Patient has returned no acute complaints however during med patient reconciliation noticed patient was not adherent to albuterol as it was causing her to have palpitations.  She admits to using nebulizer that she got in [place].  However she admits to using it every day due to chest tightness and mild wheezing.  Today she does not have chest pain, shortness of breath however does feel a little \"tight\".   Patient states that her father had colon cancer in his early 50s so she had a colonoscopy done at age 42 and last 1 3 years ago which were both normal.     Adult Annual Physical:  Patient presents for annual physical.     Diet and Physical Activity:  - Diet/Nutrition: well balanced diet.  - Exercise: walking.    Depression Screening:  - PHQ-2 Score: 0    General Health:  - Sleep: sleeps well.  - Hearing:. normal gross hearing  - Vision: no vision problems.  - Dental: regular dental visits.    /GYN Health:  - Follows with GYN: yes.   - Menopause: perimenopausal.   - History of STDs: no  - Contraception:. unsure of last period. has been expreiencing perimenopausal symptoms.      Advanced Care Planning:  - Has an advanced directive?: no      Review of Systems   Constitutional:  Negative for chills, fatigue, fever and unexpected weight change.   HENT:  " "Negative for ear pain and sore throat.    Eyes:  Negative for pain and visual disturbance.   Respiratory:  Positive for chest tightness. Negative for cough, shortness of breath and wheezing.    Cardiovascular:  Negative for chest pain and palpitations.   Gastrointestinal:  Negative for abdominal distention, abdominal pain, constipation, diarrhea, nausea and vomiting.   Genitourinary:  Negative for dysuria and hematuria.   Musculoskeletal:  Negative for arthralgias and back pain.   Skin:  Negative for color change and rash.   Neurological:  Negative for dizziness, seizures, syncope, weakness, light-headedness and headaches.   All other systems reviewed and are negative.        Objective     /85 (BP Location: Left arm, Patient Position: Sitting, Cuff Size: Standard)   Pulse 74   Temp (!) 97.2 °F (36.2 °C) (Temporal)   Resp 14   Ht 4' 10.5\" (1.486 m)   Wt 64.9 kg (143 lb)   LMP  (LMP Unknown)   SpO2 97%   BMI 29.38 kg/m²     Physical Exam  Constitutional:       General: She is not in acute distress.     Appearance: Normal appearance. She is not ill-appearing.   HENT:      Head: Normocephalic and atraumatic.      Right Ear: Tympanic membrane, ear canal and external ear normal. There is no impacted cerumen.      Left Ear: Tympanic membrane, ear canal and external ear normal. There is no impacted cerumen.      Nose: Nose normal. No congestion.      Mouth/Throat:      Mouth: Mucous membranes are moist.   Eyes:      General: No scleral icterus.     Conjunctiva/sclera: Conjunctivae normal.   Cardiovascular:      Rate and Rhythm: Normal rate.      Heart sounds: No murmur heard.  Pulmonary:      Effort: No respiratory distress.      Breath sounds: No wheezing or rales.   Abdominal:      General: Abdomen is flat. There is no distension.      Palpations: Abdomen is soft.      Tenderness: There is no abdominal tenderness. There is no right CVA tenderness, left CVA tenderness or guarding.   Musculoskeletal:      " Right lower leg: No edema.      Left lower leg: No edema.   Skin:     General: Skin is warm and dry.      Coloration: Skin is not jaundiced or pale.      Findings: No rash.   Neurological:      General: No focal deficit present.      Mental Status: She is alert and oriented to person, place, and time.   Psychiatric:         Mood and Affect: Mood normal.         Behavior: Behavior normal.

## 2024-06-22 LAB
EST. AVERAGE GLUCOSE BLD GHB EST-MCNC: 128 MG/DL
HBA1C MFR BLD: 6.1 %
HCV AB SER QL: NORMAL
HIV 1+2 AB+HIV1 P24 AG SERPL QL IA: NORMAL
HIV 2 AB SERPL QL IA: NORMAL
HIV1 AB SERPL QL IA: NORMAL
HIV1 P24 AG SERPL QL IA: NORMAL

## 2024-06-22 NOTE — ASSESSMENT & PLAN NOTE
Nonadherent to albuterol as needed    -Start Symbicort 80-4.5 MCG/ATC 1-2 puffs twice daily.   -Claritin as needed  -Return in 1- 2 months to reevaluate

## 2024-06-23 DIAGNOSIS — D50.8 OTHER IRON DEFICIENCY ANEMIA: Primary | ICD-10-CM

## 2024-06-23 RX ORDER — FERROUS SULFATE 324(65)MG
324 TABLET, DELAYED RELEASE (ENTERIC COATED) ORAL EVERY OTHER DAY
Qty: 30 TABLET | Refills: 0 | Status: SHIPPED | OUTPATIENT
Start: 2024-06-23

## 2024-06-23 NOTE — ASSESSMENT & PLAN NOTE
Non adherent to albuterol inhaler/ neb  Chest tightness related to asthma, increased in the summer due to pollen     - will try Symbicort 2 puffs BID and follow up in 1 moth.   - continue montelukast 10 mg tab daily

## 2024-06-23 NOTE — PROGRESS NOTES
Reviewed labs, CBC consistent with mild anemia, likely iron deficiency. Prediabetic per hemoglobin A1c. LDL slightly elevated but low ASCVD score < 5 5 so no need for a statin at this point.     Called both numbers on file. Daughter answered  however patient is visiting her other daughter: given number to reach out [phone] however upon calling I was told is the wrong number.     Will prescribe ferrous sulfate every other day 30 tablets.

## 2024-07-15 ENCOUNTER — TELEPHONE (OUTPATIENT)
Dept: FAMILY MEDICINE CLINIC | Facility: CLINIC | Age: 54
End: 2024-07-15

## 2024-07-16 DIAGNOSIS — G56.03 BILATERAL CARPAL TUNNEL SYNDROME: ICD-10-CM

## 2024-07-16 DIAGNOSIS — D50.8 OTHER IRON DEFICIENCY ANEMIA: ICD-10-CM

## 2024-07-16 RX ORDER — NAPROXEN SODIUM 220 MG
220 TABLET ORAL AS NEEDED
Qty: 30 TABLET | Refills: 0 | Status: SHIPPED | OUTPATIENT
Start: 2024-07-16

## 2024-07-16 RX ORDER — FERROUS SULFATE 324(65)MG
324 TABLET, DELAYED RELEASE (ENTERIC COATED) ORAL EVERY OTHER DAY
Qty: 30 TABLET | Refills: 0 | Status: SHIPPED | OUTPATIENT
Start: 2024-07-16

## 2024-07-16 NOTE — TELEPHONE ENCOUNTER
It looks like the medications were sent to the pharmacy on file but I will send them again anyway. Thanks

## 2024-07-17 NOTE — TELEPHONE ENCOUNTER
Pt didn't receive  ferrous sulfate 324 (65 Fe) mg   because insure doesn't cover that medication pt stated she will call insure because she had this medication before.

## 2024-08-15 ENCOUNTER — TELEPHONE (OUTPATIENT)
Dept: FAMILY MEDICINE CLINIC | Facility: CLINIC | Age: 54
End: 2024-08-15

## 2024-08-15 NOTE — TELEPHONE ENCOUNTER
Pt called and stating that she is living to [facility] tomorrow and she don't know when she is coming back she said is gonna be more then 6 months she want to know if you could sent shingrix vaccine to the phamarcy to she can put the second dose she almost gonna be 2 month since the first one.    Please advise, thank you

## 2025-06-10 ENCOUNTER — OFFICE VISIT (OUTPATIENT)
Dept: FAMILY MEDICINE CLINIC | Facility: CLINIC | Age: 55
End: 2025-06-10

## 2025-06-10 VITALS
SYSTOLIC BLOOD PRESSURE: 130 MMHG | HEART RATE: 107 BPM | HEIGHT: 59 IN | OXYGEN SATURATION: 98 % | WEIGHT: 147 LBS | RESPIRATION RATE: 18 BRPM | BODY MASS INDEX: 29.64 KG/M2 | DIASTOLIC BLOOD PRESSURE: 90 MMHG | TEMPERATURE: 98.7 F

## 2025-06-10 DIAGNOSIS — J45.31 MILD PERSISTENT ASTHMA WITH ACUTE EXACERBATION: ICD-10-CM

## 2025-06-10 DIAGNOSIS — J30.1 SEASONAL ALLERGIC RHINITIS DUE TO POLLEN: ICD-10-CM

## 2025-06-10 DIAGNOSIS — F41.9 ANXIETY: ICD-10-CM

## 2025-06-10 DIAGNOSIS — R03.0 ELEVATED BLOOD PRESSURE READING WITHOUT DIAGNOSIS OF HYPERTENSION: Primary | ICD-10-CM

## 2025-06-10 DIAGNOSIS — J45.909 ASTHMA DUE TO SEASONAL ALLERGIES: ICD-10-CM

## 2025-06-10 PROCEDURE — 99214 OFFICE O/P EST MOD 30 MIN: CPT

## 2025-06-10 RX ORDER — BUDESONIDE AND FORMOTEROL FUMARATE DIHYDRATE 80; 4.5 UG/1; UG/1
2 AEROSOL RESPIRATORY (INHALATION) 2 TIMES DAILY
Qty: 10.2 G | Refills: 1 | Status: SHIPPED | OUTPATIENT
Start: 2025-06-10

## 2025-06-10 RX ORDER — MONTELUKAST SODIUM 10 MG/1
10 TABLET ORAL
Qty: 90 TABLET | Refills: 1 | Status: SHIPPED | OUTPATIENT
Start: 2025-06-10

## 2025-06-10 RX ORDER — ALBUTEROL SULFATE 90 UG/1
2 INHALANT RESPIRATORY (INHALATION) EVERY 6 HOURS PRN
Qty: 18 G | Refills: 5 | Status: SHIPPED | OUTPATIENT
Start: 2025-06-10

## 2025-06-10 RX ORDER — HYDROXYZINE HYDROCHLORIDE 10 MG/1
10 TABLET, FILM COATED ORAL EVERY 6 HOURS PRN
Qty: 30 TABLET | Refills: 0 | Status: SHIPPED | OUTPATIENT
Start: 2025-06-10

## 2025-06-10 NOTE — ASSESSMENT & PLAN NOTE
- ED visit in March for exacerbation. Visit was out of town, Pt came with AVS  - Pt reports that she continues to have SOB at night  - Current regiment: Symbicort, Singulair and Albuterol   - Discussed pulmonary evaluation   - Also added Atarax for Anxiety   - F/U with PCP   Orders:    Complete PFT with post bronchodilator; Future    Ambulatory Referral to Pulmonology; Future    budesonide-formoterol (Symbicort) 80-4.5 MCG/ACT inhaler; Inhale 2 puffs 2 (two) times a day Rinse mouth after use.    albuterol (Ventolin HFA) 90 mcg/act inhaler; Inhale 2 puffs every 6 (six) hours as needed for wheezing    montelukast (SINGULAIR) 10 mg tablet; Take 1 tablet (10 mg total) by mouth daily at bedtime

## 2025-06-10 NOTE — PROGRESS NOTES
Name: Drew Gómez      : 1970      MRN: [id number]  Encounter Provider: PETER Ramsey  Encounter Date: 6/10/2025   Encounter department: [facility]  :  Assessment & Plan  Elevated blood pressure reading without diagnosis of hypertension  - Pt  was noted to have elevated BP in March during an ED visit,. She was advised to f/u with PCP.   - Brief discussion on causes of elevated BP and HTN   - Discussion on home BP monitoring, Pt will log daily reading and come with it at next afisal with PCP   - Pt verbalize understanding   Orders:    Blood Pressure Monitoring KIT; Use in the morning and before bedtime.    Anxiety  - Waldo Atarax   Orders:    hydrOXYzine HCL (ATARAX) 10 mg tablet; Take 1 tablet (10 mg total) by mouth every 6 (six) hours as needed for itching    Mild persistent asthma with acute exacerbation  - ED visit in March for exacerbation. Visit was out of town, Pt came with AVS  - Pt reports that she continues to have SOB at night  - Current regiment: Symbicort, Singulair and Albuterol   - Discussed pulmonary evaluation   - Also added Atarax for Anxiety   - F/U with PCP   Orders:    Complete PFT with post bronchodilator; Future    Ambulatory Referral to Pulmonology; Future    budesonide-formoterol (Symbicort) 80-4.5 MCG/ACT inhaler; Inhale 2 puffs 2 (two) times a day Rinse mouth after use.    albuterol (Ventolin HFA) 90 mcg/act inhaler; Inhale 2 puffs every 6 (six) hours as needed for wheezing    montelukast (SINGULAIR) 10 mg tablet; Take 1 tablet (10 mg total) by mouth daily at bedtime         History of Present Illness   Patient is a 55 y.o. female whom  has a past medical history of Abnormal Pap smear of cervix and Asthma. who is seen today in office for concern of HTN and SOB.       Hypertension  Associated symptoms include shortness of breath.     Review of Systems   Constitutional: Negative.    HENT: Negative.     Eyes: Negative.    Respiratory:   "Positive for shortness of breath.    Cardiovascular: Negative.    Gastrointestinal: Negative.    Genitourinary: Negative.    Musculoskeletal: Negative.    Neurological: Negative.    Hematological: Negative.    Psychiatric/Behavioral:  Positive for sleep disturbance. The patient is nervous/anxious.        Objective   /90 (BP Location: Left arm, Patient Position: Sitting, Cuff Size: Large)   Pulse (!) 107   Temp 98.7 °F (37.1 °C) (Temporal)   Resp 18   Ht 4' 10.5\" (1.486 m)   Wt 66.7 kg (147 lb)   LMP  (LMP Unknown)   SpO2 98%   Breastfeeding No   BMI 30.20 kg/m²      Physical Exam  Vitals reviewed.   Constitutional:       General: She is not in acute distress.     Appearance: Normal appearance. She is not ill-appearing.   HENT:      Head: Normocephalic and atraumatic.      Nose: No congestion or rhinorrhea.     Eyes:      Extraocular Movements: Extraocular movements intact.      Conjunctiva/sclera: Conjunctivae normal.      Pupils: Pupils are equal, round, and reactive to light.       Cardiovascular:      Rate and Rhythm: Normal rate.      Pulses: Normal pulses.      Heart sounds: Normal heart sounds. No murmur heard.  Pulmonary:      Effort: Pulmonary effort is normal. No respiratory distress.      Breath sounds: Normal breath sounds.   Abdominal:      General: There is no distension.      Palpations: Abdomen is soft.     Musculoskeletal:         General: No swelling. Normal range of motion.      Cervical back: Normal range of motion. No rigidity or tenderness.     Skin:     General: Skin is warm.     Neurological:      General: No focal deficit present.      Mental Status: She is alert and oriented to person, place, and time. Mental status is at baseline.     Psychiatric:         Mood and Affect: Mood normal.         Behavior: Behavior normal.         Thought Content: Thought content normal.         Judgment: Judgment normal.         "

## 2025-06-10 NOTE — ASSESSMENT & PLAN NOTE
- Pt  was noted to have elevated BP in March during an ED visit,. She was advised to f/u with PCP.   - Brief discussion on causes of elevated BP and HTN   - Discussion on home BP monitoring, Pt will log daily reading and come with it at next faisal with PCP   - Pt verbalize understanding   Orders:    Blood Pressure Monitoring KIT; Use in the morning and before bedtime.

## 2025-06-10 NOTE — ASSESSMENT & PLAN NOTE
- Immaculata Atarax   Orders:    hydrOXYzine HCL (ATARAX) 10 mg tablet; Take 1 tablet (10 mg total) by mouth every 6 (six) hours as needed for itching

## 2025-06-11 ENCOUNTER — TELEPHONE (OUTPATIENT)
Age: 55
End: 2025-06-11

## 2025-06-11 NOTE — TELEPHONE ENCOUNTER
Patient called to schedule an appointment with our practice. My next available in [place] was 7/23, which patient stated is too much of a wait. I took a look at [facility] and [place]. There was an opening on 7/15 in [place] to which patient declined as well. Patient preferred a location close to her so her daughter can bring her to the appointment. Patient states she will look elsewhere then.    Thank you.

## 2025-06-18 DIAGNOSIS — R03.0 ELEVATED BLOOD PRESSURE READING WITHOUT DIAGNOSIS OF HYPERTENSION: ICD-10-CM

## 2025-06-19 ENCOUNTER — OFFICE VISIT (OUTPATIENT)
Dept: DENTISTRY | Facility: CLINIC | Age: 55
End: 2025-06-19

## 2025-06-19 VITALS — HEART RATE: 98 BPM | SYSTOLIC BLOOD PRESSURE: 129 MMHG | TEMPERATURE: 97.8 F | DIASTOLIC BLOOD PRESSURE: 85 MMHG

## 2025-06-19 DIAGNOSIS — K08.89 NONRESTORABLE TOOTH: Primary | ICD-10-CM

## 2025-06-19 NOTE — PROGRESS NOTES
Limited Exam    Drew Gómez 55 y.o. female presents with self to [facility] for Limited exam  PMH reviewed, no changes, ASA II. Significant medical history: reviewed. Significant allergies: reviewed. Significant medications: reviewed.    Chief complaint:  Here for exam my tooth hurts    Consent:  Discussed that limited exam focuses on problem area, and same day tx is not guaranteed.  Patient explained to if they wish to have anything else evaluated, they need to return to the practice at which they are a patient of record or schedule a comprehensive exam afterwards.  Patient understands and consent was given by self via verbal consent and signed oral surgery informed consent.    Subjective history:    Onset: 2 weeks ago.   Provocation: Spontaneous.   Quality: Achy, Dull, Throbbing.   Region: Patient points to tooth #21.   Severity: 6/10.   Timing: constant.    Objective clinical findings:   Oral cancer screening: normal.   Extraoral exam: no remarkable findings.  Intraoral exam: significantly sized caries, gingival inflammation.     Radiographs: Select PA(s) - #21.         Assessment:  21 nonrestorable tooth    Plan:   21 ext    Referral(s): None needed.  Rx: None.  Comprehensive care disposition: Patient expressed interest in becoming a patient of record with one of the  dental clinics.    Patient dismissed ambulatory and alert.    Extraction #21    Drew Gómez 55 y.o. female presents with self to [facility] for extraction #21  PMH reviewed, no changes, ASA II. Significant medical history: reviewed. Significant allergies: reviewed. Significant medications: reviewed.  Pain level 7/10    Diagnosis:  Teeth #21 indicated for extraction due to Nonrestorable caries.    Consent:  Risks of specific procedure: pain, bleeding, swelling, infection, tooth fracturing to point of requiring surgical removal, damage to adjacent teeth and/or restorations on them, .  Risks of any dental procedure: post procedural pain or  sensitivity, local anesthetic side effects, allergic reaction to dental materials and medications, breakage of local anesthetic needle, aspiration of small dental tools, injury to nearby hard and soft tissues and anatomical structures.  Benefits: relieve pain or underlying infection, prevent future or further progression of infection, .  Alternatives: 2nd op, no tx.  Tx plan for extraction #21 reviewed, pt given opportunity to ask questions, all questions answered to degree of medical and dental certainty.  Patient understands and consent given by self via verbal consent and signed oral surgery informed consent.    Universal Protocol  Other Assisting Provider: Yes, Mary (assistant)  Verbal consent obtained? YES  Written consent obtained?  YES  Risks, benefits and alternatives discussed?: YES  Consent given by: self  Time Out  Immediately prior to the procedure a time out was called: YES  Time Out:  Time Out performed at:  10:15 AM  A time out verifies correct patient, procedure, equipment, support staff and site/side marked as required.  Patient states understanding of procedure being performed: YES  Patient's understanding of procedure matches consent: YES  Procedure consent matches procedure scheduled: YES  Test results available and properly labeled: N/A  Site  Verified with the patient  YES  Radiology Images displayed and confirmed.  If images not available, report reviewed:  YES  Required items - Required blood products, implants, devices and special equipment available: YES  Patient identity confirmed:  YES    Anesthesia:  Topical 20% benzocaine.  1 carps 4% Septocaine 1:100k epi via buccal infiltration and palatal/lingual infiltration.    Procedure details:  Reflected gingiva with periosteal elevator.  Elevated, and extracted #21 with straight elevator(s) and/or forceps.  Socket curetted and irrigated with sterile saline.  Manual alveolar compression with gauze 30 seconds. Hemostasis achieved.  4-0 vicryl  sutures placed.     Post-op instructions given verbally and on paper.  Patient given ice and gauze.    Rx: None.    Patient dismissed ambulatory and alert.      NV: comp exam.    Attending: Dr. Chau was present in clinic.

## 2025-06-23 ENCOUNTER — PREP FOR PROCEDURE (OUTPATIENT)
Age: 55
End: 2025-06-23

## 2025-06-23 ENCOUNTER — OFFICE VISIT (OUTPATIENT)
Dept: FAMILY MEDICINE CLINIC | Facility: CLINIC | Age: 55
End: 2025-06-23

## 2025-06-23 ENCOUNTER — APPOINTMENT (OUTPATIENT)
Dept: LAB | Facility: CLINIC | Age: 55
End: 2025-06-23
Payer: MEDICARE

## 2025-06-23 ENCOUNTER — TELEPHONE (OUTPATIENT)
Age: 55
End: 2025-06-23

## 2025-06-23 VITALS
OXYGEN SATURATION: 99 % | WEIGHT: 146.8 LBS | DIASTOLIC BLOOD PRESSURE: 80 MMHG | BODY MASS INDEX: 29.6 KG/M2 | HEIGHT: 59 IN | SYSTOLIC BLOOD PRESSURE: 130 MMHG | TEMPERATURE: 97.6 F | HEART RATE: 85 BPM | RESPIRATION RATE: 16 BRPM

## 2025-06-23 DIAGNOSIS — E66.9 OBESITY (BMI 30-39.9): ICD-10-CM

## 2025-06-23 DIAGNOSIS — Z12.11 SCREENING FOR COLON CANCER: Primary | ICD-10-CM

## 2025-06-23 DIAGNOSIS — Z23 ENCOUNTER FOR IMMUNIZATION: ICD-10-CM

## 2025-06-23 DIAGNOSIS — J45.909 ASTHMA DUE TO SEASONAL ALLERGIES: ICD-10-CM

## 2025-06-23 DIAGNOSIS — Z12.11 COLON CANCER SCREENING: ICD-10-CM

## 2025-06-23 DIAGNOSIS — Z00.00 ANNUAL PHYSICAL EXAM: Primary | ICD-10-CM

## 2025-06-23 DIAGNOSIS — Z12.31 ENCOUNTER FOR SCREENING MAMMOGRAM FOR BREAST CANCER: ICD-10-CM

## 2025-06-23 LAB
ANION GAP SERPL CALCULATED.3IONS-SCNC: 9 MMOL/L (ref 4–13)
BASOPHILS # BLD AUTO: 0.03 THOUSANDS/ÂΜL (ref 0–0.1)
BASOPHILS NFR BLD AUTO: 0 % (ref 0–1)
BUN SERPL-MCNC: 14 MG/DL (ref 5–25)
CALCIUM SERPL-MCNC: 9.1 MG/DL (ref 8.4–10.2)
CHLORIDE SERPL-SCNC: 103 MMOL/L (ref 96–108)
CHOLEST SERPL-MCNC: 229 MG/DL (ref ?–200)
CO2 SERPL-SCNC: 25 MMOL/L (ref 21–32)
CREAT SERPL-MCNC: 0.68 MG/DL (ref 0.6–1.3)
EOSINOPHIL # BLD AUTO: 0.28 THOUSAND/ÂΜL (ref 0–0.61)
EOSINOPHIL NFR BLD AUTO: 4 % (ref 0–6)
ERYTHROCYTE [DISTWIDTH] IN BLOOD BY AUTOMATED COUNT: 15.5 % (ref 11.6–15.1)
EST. AVERAGE GLUCOSE BLD GHB EST-MCNC: 128 MG/DL
GFR SERPL CREATININE-BSD FRML MDRD: 98 ML/MIN/1.73SQ M
GLUCOSE P FAST SERPL-MCNC: 94 MG/DL (ref 65–99)
HBA1C MFR BLD: 6.1 %
HCT VFR BLD AUTO: 36.9 % (ref 34.8–46.1)
HDLC SERPL-MCNC: 44 MG/DL
HGB BLD-MCNC: 11.6 G/DL (ref 11.5–15.4)
IMM GRANULOCYTES # BLD AUTO: 0.05 THOUSAND/UL (ref 0–0.2)
IMM GRANULOCYTES NFR BLD AUTO: 1 % (ref 0–2)
LDLC SERPL CALC-MCNC: 167 MG/DL (ref 0–100)
LYMPHOCYTES # BLD AUTO: 3.72 THOUSANDS/ÂΜL (ref 0.6–4.47)
LYMPHOCYTES NFR BLD AUTO: 46 % (ref 14–44)
MCH RBC QN AUTO: 25.8 PG (ref 26.8–34.3)
MCHC RBC AUTO-ENTMCNC: 31.4 G/DL (ref 31.4–37.4)
MCV RBC AUTO: 82 FL (ref 82–98)
MONOCYTES # BLD AUTO: 0.67 THOUSAND/ÂΜL (ref 0.17–1.22)
MONOCYTES NFR BLD AUTO: 9 % (ref 4–12)
NEUTROPHILS # BLD AUTO: 3.09 THOUSANDS/ÂΜL (ref 1.85–7.62)
NEUTS SEG NFR BLD AUTO: 40 % (ref 43–75)
NRBC BLD AUTO-RTO: 0 /100 WBCS
PLATELET # BLD AUTO: 355 THOUSANDS/UL (ref 149–390)
PMV BLD AUTO: 8.9 FL (ref 8.9–12.7)
POTASSIUM SERPL-SCNC: 4.2 MMOL/L (ref 3.5–5.3)
RBC # BLD AUTO: 4.5 MILLION/UL (ref 3.81–5.12)
SODIUM SERPL-SCNC: 137 MMOL/L (ref 135–147)
TRIGL SERPL-MCNC: 92 MG/DL (ref ?–150)
WBC # BLD AUTO: 7.84 THOUSAND/UL (ref 4.31–10.16)

## 2025-06-23 PROCEDURE — 90750 HZV VACC RECOMBINANT IM: CPT

## 2025-06-23 PROCEDURE — 36415 COLL VENOUS BLD VENIPUNCTURE: CPT

## 2025-06-23 PROCEDURE — 99214 OFFICE O/P EST MOD 30 MIN: CPT

## 2025-06-23 PROCEDURE — 99396 PREV VISIT EST AGE 40-64: CPT

## 2025-06-23 PROCEDURE — 80048 BASIC METABOLIC PNL TOTAL CA: CPT

## 2025-06-23 PROCEDURE — 85025 COMPLETE CBC W/AUTO DIFF WBC: CPT

## 2025-06-23 PROCEDURE — 80061 LIPID PANEL: CPT

## 2025-06-23 PROCEDURE — 90471 IMMUNIZATION ADMIN: CPT

## 2025-06-23 PROCEDURE — 83036 HEMOGLOBIN GLYCOSYLATED A1C: CPT

## 2025-06-23 RX ORDER — ALBUTEROL SULFATE 0.83 MG/ML
2.5 SOLUTION RESPIRATORY (INHALATION) ONCE
Status: COMPLETED | OUTPATIENT
Start: 2025-06-25 | End: 2025-06-25

## 2025-06-23 RX ORDER — BUDESONIDE AND FORMOTEROL FUMARATE DIHYDRATE 160; 4.5 UG/1; UG/1
2 AEROSOL RESPIRATORY (INHALATION) 2 TIMES DAILY
Qty: 10.2 G | Refills: 0 | Status: SHIPPED | OUTPATIENT
Start: 2025-06-23

## 2025-06-23 NOTE — TELEPHONE ENCOUNTER
Scheduled date of colonoscopy (as of today): 8/13/25    Physician performing colonoscopy: Dr. Garg    Location of colonoscopy:  [facility]    Bowel prep reviewed with patient:     [email or web address]     RAUL/DEBI (Tajik)

## 2025-06-23 NOTE — PROGRESS NOTES
Adult Annual Physical  Name: Drew Gómez      : 1970      MRN: [id number]  Encounter Provider: PETER Muse  Encounter Date: 2025   Encounter department: [facility]    :  Assessment & Plan  Annual physical exam         Encounter for screening mammogram for breast cancer    Orders:    Mammo screening bilateral w 3d and cad; Future    Asthma due to seasonal allergies  restarted on Symbicort two weeks ago and states that it is not working. She is experiencing SOB & wheezing every day. She is using her albuterol daily. Increase Symbicort from 80-4.5 to 160-4.5 BID. Continue Singulair.   Orders:    budesonide-formoterol (Symbicort) 160-4.5 mcg/act inhaler; Inhale 2 puffs 2 (two) times a day Rinse mouth after use.    Basic metabolic panel; Future    Hemoglobin A1C; Future    Lipid Panel with Direct LDL reflex; Future    CBC and differential; Future    Encounter for immunization    Orders:    Zoster Vaccine Recombinant IM    Colon cancer screening    Orders:    Ambulatory Referral to Gastroenterology; Future    Obesity (BMI 30-39.9)      Orders:    Basic metabolic panel; Future    Hemoglobin A1C; Future    Lipid Panel with Direct LDL reflex; Future    CBC and differential; Future        Preventive Screenings:    - Hepatitis C screening: screening up-to-date   - HIV screening: screening up-to-date   - Cervical cancer screening: screening up-to-date   - Lung cancer screening: screening not indicated     Counseling/Anticipatory Guidance:    - Diet: discussed recommendations for a healthy/well-balanced diet.   - Exercise: the importance of regular exercise/physical activity was discussed. Recommend exercise 3-5 times per week for at least 30 minutes.     BMI Counseling: Body mass index is 30.16 kg/m². The BMI is above normal. Nutrition recommendations include decreasing portion sizes, encouraging healthy choices of fruits and vegetables, decreasing fast  food intake, consuming healthier snacks, limiting drinks that contain sugar, moderation in carbohydrate intake, increasing intake of lean protein, reducing intake of saturated and trans fat and reducing intake of cholesterol. Exercise recommendations include moderate physical activity 150 minutes/week. No pharmacotherapy was ordered. Rationale for BMI follow-up plan is due to patient being overweight or obese.     Depression Screening and Follow-up Plan: Patient was screened for depression during today's encounter. They screened negative with a PHQ-2 score of 0.          History of Present Illness     Adult Annual Physical:  Patient presents for annual physical.     Diet and Physical Activity:  - Diet/Nutrition: well balanced diet.  - Exercise: no formal exercise.    Depression Screening:  - PHQ-2 Score: 0    General Health:  - Sleep: sleeps poorly.  - Hearing: normal hearing left ear.  - Vision: no vision problems.  - Dental: regular dental visits.    /GYN Health:  - Follows with GYN: no.   - History of STDs: no    Advanced Care Planning:  - Has an advanced directive?: no    - Has a durable medical POA?: no    - ACP document given to patient?: no      Review of Systems   Constitutional:  Negative for chills and fever.   HENT:  Negative for ear pain and sore throat.    Eyes:  Negative for pain and visual disturbance.   Respiratory:  Negative for cough and shortness of breath.    Cardiovascular:  Negative for chest pain and palpitations.   Gastrointestinal:  Negative for abdominal pain and vomiting.   Genitourinary:  Negative for dysuria and hematuria.   Musculoskeletal:  Negative for arthralgias and back pain.   Skin:  Negative for color change and rash.   Neurological:  Negative for seizures and syncope.   All other systems reviewed and are negative.        Objective   /80 (BP Location: Left arm, Patient Position: Sitting, Cuff Size: Standard)   Pulse 85   Temp 97.6 °F (36.4 °C) (Temporal)   Resp 16    "Ht 4' 10.5\" (1.486 m)   Wt 66.6 kg (146 lb 12.8 oz)   LMP  (LMP Unknown)   SpO2 99%   BMI 30.16 kg/m²     Physical Exam  Vitals and nursing note reviewed.   Constitutional:       General: She is not in acute distress.     Appearance: Normal appearance. She is well-developed. She is obese.   HENT:      Head: Normocephalic and atraumatic.      Right Ear: External ear normal.      Left Ear: External ear normal.      Nose: Nose normal.     Eyes:      Conjunctiva/sclera: Conjunctivae normal.       Cardiovascular:      Rate and Rhythm: Normal rate and regular rhythm.      Pulses: Normal pulses.      Heart sounds: Normal heart sounds. No murmur heard.  Pulmonary:      Effort: Pulmonary effort is normal. No respiratory distress.      Breath sounds: Normal breath sounds.   Abdominal:      Palpations: Abdomen is soft.      Tenderness: There is no abdominal tenderness.     Musculoskeletal:         General: No swelling. Normal range of motion.      Cervical back: Normal range of motion and neck supple.     Skin:     General: Skin is warm and dry.      Capillary Refill: Capillary refill takes less than 2 seconds.     Neurological:      General: No focal deficit present.      Mental Status: She is alert and oriented to person, place, and time. Mental status is at baseline.     Psychiatric:         Mood and Affect: Mood normal.         Behavior: Behavior normal.         Thought Content: Thought content normal.         Judgment: Judgment normal.         "

## 2025-06-23 NOTE — ASSESSMENT & PLAN NOTE
restarted on Symbicort two weeks ago and states that it is not working. She is experiencing SOB & wheezing every day. She is using her albuterol daily. Increase Symbicort from 80-4.5 to 160-4.5 BID. Continue Singulair.   Orders:    budesonide-formoterol (Symbicort) 160-4.5 mcg/act inhaler; Inhale 2 puffs 2 (two) times a day Rinse mouth after use.    Basic metabolic panel; Future    Hemoglobin A1C; Future    Lipid Panel with Direct LDL reflex; Future    CBC and differential; Future

## 2025-06-23 NOTE — PATIENT INSTRUCTIONS
"Patient Education   [phone]: mammografia  Routine physical for adults   The Basics   Written by the doctors and editors at [facility]   What is a physical? -- A physical is a routine visit, or \"check-up,\" with your doctor. You might also hear it called a \"wellness visit\" or \"preventive visit.\"  During each visit, the doctor will:   Ask about your physical and mental health   Ask about your habits, behaviors, and lifestyle   Do an exam   Give you vaccines if needed   Talk to you about any medicines you take   Give advice about your health   Answer your questions  Getting regular check-ups is an important part of taking care of your health. It can help your doctor find and treat any problems you have. But it's also important for preventing health problems.  A routine physical is different from a \"sick visit.\" A sick visit is when you see a doctor because of a health concern or problem. Since physicals are scheduled ahead of time, you can think about what you want to ask the doctor.  How often should I get a physical? -- It depends on your age and health. In general, for people age 21 years and older:   If you are younger than 50 years, you might be able to get a physical every 3 years.   If you are 50 years or older, your doctor might recommend a physical every year.  If you have an ongoing health condition, like diabetes or high blood pressure, your doctor will probably want to see you more often.  What happens during a physical? -- In general, each visit will include:   Physical exam - The doctor or nurse will check your height, weight, heart rate, and blood pressure. They will also look at your eyes and ears. They will ask about how you are feeling and whether you have any symptoms that bother you.   Medicines - It's a good idea to bring a list of all the medicines you take to each doctor visit. Your doctor will talk to you about your medicines and answer any questions. Tell them if you are having any side " "effects that bother you. You should also tell them if you are having trouble paying for any of your medicines.   Habits and behaviors - This includes:   Your diet   Your exercise habits   Whether you smoke, drink alcohol, or use drugs   Whether you are sexually active   Whether you feel safe at home  Your doctor will talk to you about things you can do to improve your health and lower your risk of health problems. They will also offer help and support. For example, if you want to quit smoking, they can give you advice and might prescribe medicines. If you want to improve your diet or get more physical activity, they can help you with this, too.   Lab tests, if needed - The tests you get will depend on your age and situation. For example, your doctor might want to check your:   Cholesterol   Blood sugar   Iron level   Vaccines - The recommended vaccines will depend on your age, health, and what vaccines you already had. Vaccines are very important because they can prevent certain serious or deadly infections.   Discussion of screening - \"Screening\" means checking for diseases or other health problems before they cause symptoms. Your doctor can recommend screening based on your age, risk, and preferences. This might include tests to check for:   Cancer, such as breast, prostate, cervical, ovarian, colorectal, prostate, lung, or skin cancer   Sexually transmitted infections, such as chlamydia and gonorrhea   Mental health conditions like depression and anxiety  Your doctor will talk to you about the different types of screening tests. They can help you decide which screenings to have. They can also explain what the results might mean.   Answering questions - The physical is a good time to ask the doctor or nurse questions about your health. If needed, they can refer you to other doctors or specialists, too.  Adults older than 65 years often need other care, too. As you get older, your doctor will talk to you about:   " How to prevent falling at home   Hearing or vision tests   Memory testing   How to take your medicines safely   Making sure that you have the help and support you need at home  All topics are updated as new evidence becomes available and our peer review process is complete.  This topic retrieved from [employer] on: May 02, 2024.  Topic 521172 Version 1.0  Release: 32.4.3 - C32.122  © 2024 UpToDate, Inc. and/or its affiliates. All rights reserved.  Consumer Information Use and Disclaimer   Disclaimer: This generalized information is a limited summary of diagnosis, treatment, and/or medication information. It is not meant to be comprehensive and should be used as a tool to help the user understand and/or assess potential diagnostic and treatment options. It does NOT include all information about conditions, treatments, medications, side effects, or risks that may apply to a specific patient. It is not intended to be medical advice or a substitute for the medical advice, diagnosis, or treatment of a health care provider based on the health care provider's examination and assessment of a patient's specific and unique circumstances. Patients must speak with a health care provider for complete information about their health, medical questions, and treatment options, including any risks or benefits regarding use of medications. This information does not endorse any treatments or medications as safe, effective, or approved for treating a specific patient. UpToDate, Inc. and its affiliates disclaim any warranty or liability relating to this information or the use thereof.The use of this information is governed by the Terms of Use, available at https://www.wolterskluwer.com/en/know/clinical-effectiveness-terms. 2024© UpToDate, Inc. and its affiliates and/or licensors. All rights reserved.  Copyright   © 2024 Up[employer]Date, Inc. and/or its affiliates. All rights reserved.

## 2025-06-23 NOTE — TELEPHONE ENCOUNTER
06/23/25  Screened by: Charla Hickman    Referring Provider PETER Judd    Pre- Screening:     There is no height or weight on file to calculate BMI.  30.16  Has patient been referred for a routine screening Colonoscopy? yes  Is the patient between 45-75 years old? yes      Previous Colonoscopy no   If yes:    Date:     Facility:     Reason:         Does the patient want to see a Gastroenterologist prior to their procedure OR are they having any GI symptoms? no    Has the patient been hospitalized or had abdominal surgery in the past 6 months? no    Does the patient use supplemental oxygen? no    Does the patient take Coumadin, Lovenox, Plavix, Elliquis, Xarelto, or other blood thinning medication? no    Has the patient had a stroke, cardiac event, or stent placed in the past year? no        If patient is between 45yrs - 49yrs, please advise patient that we will have to confirm benefits & coverage with their insurance company for a routine screening colonoscopy.

## 2025-06-25 ENCOUNTER — APPOINTMENT (OUTPATIENT)
Dept: LAB | Facility: HOSPITAL | Age: 55
End: 2025-06-25
Payer: MEDICARE

## 2025-06-25 ENCOUNTER — HOSPITAL ENCOUNTER (OUTPATIENT)
Dept: PULMONOLOGY | Facility: HOSPITAL | Age: 55
Discharge: HOME | End: 2025-06-25
Payer: MEDICARE

## 2025-06-25 DIAGNOSIS — Z00.6 ENCOUNTER FOR EXAMINATION FOR NORMAL COMPARISON OR CONTROL IN CLINICAL RESEARCH PROGRAM: ICD-10-CM

## 2025-06-25 DIAGNOSIS — J45.31 MILD PERSISTENT ASTHMA WITH ACUTE EXACERBATION: ICD-10-CM

## 2025-06-25 PROCEDURE — 94060 EVALUATION OF WHEEZING: CPT | Performed by: INTERNAL MEDICINE

## 2025-06-25 PROCEDURE — 94729 DIFFUSING CAPACITY: CPT | Performed by: INTERNAL MEDICINE

## 2025-06-25 PROCEDURE — 94726 PLETHYSMOGRAPHY LUNG VOLUMES: CPT | Performed by: INTERNAL MEDICINE

## 2025-06-25 PROCEDURE — 36415 COLL VENOUS BLD VENIPUNCTURE: CPT

## 2025-06-25 PROCEDURE — 94060 EVALUATION OF WHEEZING: CPT

## 2025-06-25 PROCEDURE — 94729 DIFFUSING CAPACITY: CPT

## 2025-06-25 PROCEDURE — 94726 PLETHYSMOGRAPHY LUNG VOLUMES: CPT

## 2025-06-25 PROCEDURE — 94760 N-INVAS EAR/PLS OXIMETRY 1: CPT

## 2025-06-25 RX ADMIN — ALBUTEROL SULFATE 2.5 MG: 2.5 SOLUTION RESPIRATORY (INHALATION) at 14:37

## 2025-07-04 LAB
APOB+LDLR+PCSK9 GENE MUT ANL BLD/T: NOT DETECTED
BRCA1+BRCA2 DEL+DUP + FULL MUT ANL BLD/T: NOT DETECTED
MLH1+MSH2+MSH6+PMS2 GN DEL+DUP+FUL M: NOT DETECTED

## 2025-07-09 ENCOUNTER — HOSPITAL ENCOUNTER (OUTPATIENT)
Dept: MAMMOGRAPHY | Facility: CLINIC | Age: 55
Discharge: HOME | End: 2025-07-09
Payer: MEDICARE

## 2025-07-09 VITALS — HEIGHT: 59 IN | BODY MASS INDEX: 29.43 KG/M2 | WEIGHT: 146 LBS

## 2025-07-09 DIAGNOSIS — Z12.31 ENCOUNTER FOR SCREENING MAMMOGRAM FOR BREAST CANCER: ICD-10-CM

## 2025-07-09 PROCEDURE — 77063 BREAST TOMOSYNTHESIS BI: CPT

## 2025-07-09 PROCEDURE — 77067 SCR MAMMO BI INCL CAD: CPT

## 2025-07-21 ENCOUNTER — TELEPHONE (OUTPATIENT)
Dept: FAMILY MEDICINE CLINIC | Facility: CLINIC | Age: 55
End: 2025-07-21

## 2025-07-21 DIAGNOSIS — J45.909 ASTHMA DUE TO SEASONAL ALLERGIES: ICD-10-CM

## 2025-07-21 RX ORDER — BUDESONIDE AND FORMOTEROL FUMARATE DIHYDRATE 160; 4.5 UG/1; UG/1
AEROSOL RESPIRATORY (INHALATION)
Qty: 10.2 G | Refills: 1 | Status: SHIPPED | OUTPATIENT
Start: 2025-07-21

## 2025-07-21 NOTE — TELEPHONE ENCOUNTER
Pt call requesting an early refill of the medication needed for the colonoscopy.     Informed that they need to contact the GI office directly for the colonoscopy prep. Provided number as it appears in Mychart.

## 2025-07-28 ENCOUNTER — TELEPHONE (OUTPATIENT)
Dept: GASTROENTEROLOGY | Facility: MEDICAL CENTER | Age: 55
End: 2025-07-28

## 2025-07-30 ENCOUNTER — ANESTHESIA (OUTPATIENT)
Dept: ANESTHESIOLOGY | Facility: HOSPITAL | Age: 55
End: 2025-07-30

## 2025-07-30 ENCOUNTER — ANESTHESIA EVENT (OUTPATIENT)
Dept: ANESTHESIOLOGY | Facility: HOSPITAL | Age: 55
End: 2025-07-30

## 2025-08-04 ENCOUNTER — TELEPHONE (OUTPATIENT)
Dept: GASTROENTEROLOGY | Facility: MEDICAL CENTER | Age: 55
End: 2025-08-04

## 2025-08-12 ENCOUNTER — OFFICE VISIT (OUTPATIENT)
Dept: FAMILY MEDICINE CLINIC | Facility: CLINIC | Age: 55
End: 2025-08-12

## 2025-08-12 PROBLEM — E78.5 DYSLIPIDEMIA: Status: ACTIVE | Noted: 2025-08-12

## 2025-08-12 PROBLEM — J45.31 MILD PERSISTENT ASTHMA WITH ACUTE EXACERBATION: Status: RESOLVED | Noted: 2025-06-10 | Resolved: 2025-08-12

## 2025-08-12 PROBLEM — J45.40 MODERATE PERSISTENT ASTHMA WITHOUT COMPLICATION: Status: ACTIVE | Noted: 2023-10-11

## 2025-08-12 PROBLEM — R03.0 ELEVATED BLOOD PRESSURE READING WITHOUT DIAGNOSIS OF HYPERTENSION: Status: RESOLVED | Noted: 2025-06-10 | Resolved: 2025-08-12

## 2025-08-12 PROBLEM — R73.03 PREDIABETES: Status: ACTIVE | Noted: 2025-08-12

## 2025-08-13 ENCOUNTER — HOSPITAL ENCOUNTER (OUTPATIENT)
Dept: GASTROENTEROLOGY | Facility: MEDICAL CENTER | Age: 55
Setting detail: OUTPATIENT SURGERY
Discharge: HOME | End: 2025-08-13
Attending: INTERNAL MEDICINE
Payer: MEDICARE

## 2025-08-13 ENCOUNTER — ANESTHESIA (OUTPATIENT)
Dept: GASTROENTEROLOGY | Facility: MEDICAL CENTER | Age: 55
End: 2025-08-13
Payer: MEDICARE

## 2025-08-13 ENCOUNTER — ANESTHESIA EVENT (OUTPATIENT)
Dept: GASTROENTEROLOGY | Facility: MEDICAL CENTER | Age: 55
End: 2025-08-13
Payer: MEDICARE

## 2025-08-13 VITALS
HEIGHT: 59 IN | WEIGHT: 152 LBS | TEMPERATURE: 97.6 F | SYSTOLIC BLOOD PRESSURE: 109 MMHG | RESPIRATION RATE: 20 BRPM | BODY MASS INDEX: 30.64 KG/M2 | OXYGEN SATURATION: 100 % | DIASTOLIC BLOOD PRESSURE: 59 MMHG | HEART RATE: 81 BPM

## 2025-08-13 DIAGNOSIS — Z12.11 COLON CANCER SCREENING: ICD-10-CM

## 2025-08-13 DIAGNOSIS — Z12.11 SCREENING FOR COLON CANCER: ICD-10-CM

## 2025-08-13 LAB
EXT PREGNANCY TEST URINE: NEGATIVE
EXT. CONTROL: NORMAL

## 2025-08-13 PROCEDURE — 81025 URINE PREGNANCY TEST: CPT | Performed by: STUDENT IN AN ORGANIZED HEALTH CARE EDUCATION/TRAINING PROGRAM

## 2025-08-13 RX ORDER — SODIUM CHLORIDE, SODIUM LACTATE, POTASSIUM CHLORIDE, CALCIUM CHLORIDE 600; 310; 30; 20 MG/100ML; MG/100ML; MG/100ML; MG/100ML
125 INJECTION, SOLUTION INTRAVENOUS CONTINUOUS
Status: DISCONTINUED | OUTPATIENT
Start: 2025-08-13 | End: 2025-08-13

## 2025-08-13 RX ADMIN — SODIUM CHLORIDE, SODIUM LACTATE, POTASSIUM CHLORIDE, AND CALCIUM CHLORIDE 125 ML/HR: .6; .31; .03; .02 INJECTION, SOLUTION INTRAVENOUS at 12:59

## 2025-08-13 RX ADMIN — Medication 40 MG: at 13:29
